# Patient Record
Sex: MALE | URBAN - METROPOLITAN AREA
[De-identification: names, ages, dates, MRNs, and addresses within clinical notes are randomized per-mention and may not be internally consistent; named-entity substitution may affect disease eponyms.]

---

## 2017-06-01 NOTE — ASU PATIENT PROFILE, ADULT - VISION (WITH CORRECTIVE LENSES IF THE PATIENT USUALLY WEARS THEM):
Partially impaired: cannot see medication labels or newsprint, but can see obstacles in path, and the surrounding layout; can count fingers at arm's length Partially impaired: cannot see medication labels or newsprint, but can see obstacles in path, and the surrounding layout; can count fingers at arm's length/Left eye distored

## 2017-06-01 NOTE — ASU PATIENT PROFILE, ADULT - TEACHING/LEARNING LEARNING PREFERENCES
written material/verbal instruction/individual instruction verbal instruction/individual instruction/audio/written material

## 2017-06-01 NOTE — ASU PATIENT PROFILE, ADULT - PSH
Cataract Right eye    H/O eye surgery  s/p detached retina right eye  History of right inguinal hernia repair    History of tonsillectomy    History of vasectomy    S/P cataract surgery, left Cataract Right eye    H/O eye surgery  s/p detached retina right eye  History of meniscal tear  Left knee  History of right inguinal hernia repair  Right  History of tonsillectomy    History of vasectomy    S/P cataract surgery, left

## 2017-06-02 ENCOUNTER — OUTPATIENT (OUTPATIENT)
Dept: OUTPATIENT SERVICES | Facility: HOSPITAL | Age: 62
LOS: 1 days | End: 2017-06-02
Payer: COMMERCIAL

## 2017-06-02 ENCOUNTER — TRANSCRIPTION ENCOUNTER (OUTPATIENT)
Age: 62
End: 2017-06-02

## 2017-06-02 VITALS
TEMPERATURE: 99 F | HEART RATE: 71 BPM | SYSTOLIC BLOOD PRESSURE: 117 MMHG | WEIGHT: 243.39 LBS | OXYGEN SATURATION: 97 % | RESPIRATION RATE: 12 BRPM | DIASTOLIC BLOOD PRESSURE: 79 MMHG | HEIGHT: 75 IN

## 2017-06-02 VITALS
SYSTOLIC BLOOD PRESSURE: 135 MMHG | HEART RATE: 66 BPM | DIASTOLIC BLOOD PRESSURE: 72 MMHG | RESPIRATION RATE: 15 BRPM | OXYGEN SATURATION: 100 %

## 2017-06-02 DIAGNOSIS — T85.398A OTHER MECHANICAL COMPLICATION OF OTHER OCULAR PROSTHETIC DEVICES, IMPLANTS AND GRAFTS, INITIAL ENCOUNTER: ICD-10-CM

## 2017-06-02 DIAGNOSIS — Z87.828 PERSONAL HISTORY OF OTHER (HEALED) PHYSICAL INJURY AND TRAUMA: Chronic | ICD-10-CM

## 2017-06-02 DIAGNOSIS — H35.372 PUCKERING OF MACULA, LEFT EYE: ICD-10-CM

## 2017-06-02 DIAGNOSIS — Z98.42 CATARACT EXTRACTION STATUS, LEFT EYE: Chronic | ICD-10-CM

## 2017-06-02 PROCEDURE — 67042 VIT FOR MACULAR HOLE: CPT | Mod: LT

## 2017-06-02 NOTE — ASU DISCHARGE PLAN (ADULT/PEDIATRIC). - NOTIFY
Pain not relieved by Medications/Bleeding that does not stop/Fever greater than 101/Persistent Nausea and Vomiting

## 2017-06-19 ENCOUNTER — TRANSCRIPTION ENCOUNTER (OUTPATIENT)
Age: 62
End: 2017-06-19

## 2017-06-19 ENCOUNTER — OUTPATIENT (OUTPATIENT)
Dept: OUTPATIENT SERVICES | Facility: HOSPITAL | Age: 62
LOS: 1 days | End: 2017-06-19
Payer: COMMERCIAL

## 2017-06-19 VITALS
RESPIRATION RATE: 11 BRPM | SYSTOLIC BLOOD PRESSURE: 128 MMHG | DIASTOLIC BLOOD PRESSURE: 80 MMHG | HEART RATE: 62 BPM | OXYGEN SATURATION: 97 %

## 2017-06-19 VITALS
HEIGHT: 75 IN | TEMPERATURE: 98 F | HEART RATE: 75 BPM | OXYGEN SATURATION: 97 % | SYSTOLIC BLOOD PRESSURE: 123 MMHG | RESPIRATION RATE: 14 BRPM | WEIGHT: 246.04 LBS | DIASTOLIC BLOOD PRESSURE: 86 MMHG

## 2017-06-19 DIAGNOSIS — Z98.42 CATARACT EXTRACTION STATUS, LEFT EYE: Chronic | ICD-10-CM

## 2017-06-19 DIAGNOSIS — H33.22 SEROUS RETINAL DETACHMENT, LEFT EYE: ICD-10-CM

## 2017-06-19 DIAGNOSIS — Z87.828 PERSONAL HISTORY OF OTHER (HEALED) PHYSICAL INJURY AND TRAUMA: Chronic | ICD-10-CM

## 2017-06-19 PROCEDURE — C1784: CPT

## 2017-06-19 PROCEDURE — C1889: CPT

## 2017-06-19 PROCEDURE — C1814: CPT

## 2017-06-19 PROCEDURE — 67108 REPAIR DETACHED RETINA: CPT | Mod: LT

## 2017-06-19 NOTE — ASU DISCHARGE PLAN (ADULT/PEDIATRIC). - PROCEDURE
left eye pars plana vitrectomy, membrane peel,injection of silicone oil, endocautery, perflouron injection, endolaser

## 2017-06-19 NOTE — ASU PATIENT PROFILE, ADULT - PSH
Cataract Right eye    H/O eye surgery  s/p detached retina right eye  History of meniscal tear  Left knee  History of right inguinal hernia repair  Right  History of tonsillectomy    History of vasectomy    S/P cataract surgery, left

## 2017-06-19 NOTE — ASU DISCHARGE PLAN (ADULT/PEDIATRIC). - NOTIFY
Pain not relieved by Medications/Swelling that continues/Bleeding that does not stop/Fever greater than 101/Persistent Nausea and Vomiting

## 2018-01-15 NOTE — ASU PATIENT PROFILE, ADULT - TEACHING/LEARNING FACTORS IMPACT ABILITY TO LEARN
Called patient and informing patient we have not received any medical records yet. Patient stated that he did ask his previous PCP to send over medical records from [place]. Patient informed that he will try again to call and have them fax over medical records.    visual problems

## 2022-11-18 NOTE — ASU DISCHARGE PLAN (ADULT/PEDIATRIC). - CONDITIONS AT DISCHARGE
Patient comes in with three days of cough, fatigue, fever. Went to [facility] and dx with pneumonia and COVID, patient has been immunized but is on long term steroids. Pt with small amount of labored breathing   Pt awake, alert and stable.